# Patient Record
Sex: FEMALE | Race: WHITE
[De-identification: names, ages, dates, MRNs, and addresses within clinical notes are randomized per-mention and may not be internally consistent; named-entity substitution may affect disease eponyms.]

---

## 2019-04-08 ENCOUNTER — HOSPITAL ENCOUNTER (OUTPATIENT)
Dept: HOSPITAL 61 - NM | Age: 39
Discharge: HOME | End: 2019-04-08
Attending: FAMILY MEDICINE
Payer: COMMERCIAL

## 2019-04-08 DIAGNOSIS — R06.02: ICD-10-CM

## 2019-04-08 DIAGNOSIS — R53.83: ICD-10-CM

## 2019-04-08 DIAGNOSIS — R06.00: ICD-10-CM

## 2019-04-08 DIAGNOSIS — R03.0: Primary | ICD-10-CM

## 2019-04-08 PROCEDURE — 93017 CV STRESS TEST TRACING ONLY: CPT

## 2019-04-08 NOTE — RAD
MR#: [id number]

Account#: [id number]

Accession#: [id number]

Date of Study: 04/08/2019

Ordering Physician: DAGOBERTO PANDA, 

Referring Physician: LUTHER FERRER Tech: 





--------------- APPROVED REPORT --------------





Test Type:          Exercise

Stress Nurse/Tech: Simin Moore RN

Test Indications: Dyspnea,vomitting x1 month ago, elevated blood pressure

Cardiac History: Hypertension,prediabetic

Medications:     See Electronic Medical Record

Medical History: See Electronic Medical Record

Resting ECG:     SR

Resting Heart Rate: 75 bpm

Resting Blood Pressure: 139/82mmHg

Pretest Chest Pain: No chest pain



Nurse/Tech Notes

S1,S2 and lungs are clear to auscultation.

Consent: The procedure was explained to the patient in lay terms. Informed consent was witnessed. Odell
eout was entered into [employer]. History and Stress Test performed by Isela Ewing, RT (R) (N)



Stress Symptoms

Fatigue



POST EXERCISE

Reason for Termination: Reached target heart rate, Fatigue

Target HR: Yes

Max HR: 159 bpm

103% of Maximum Predicted HR: 154 bpm

Exercise duration: 7:26 min:sec, 3 Stage

Exercise capacity: 10METs

Max Blood Pressure: 159/98mmHg

Blood Pressure response to exercise: Normal blood pressure response during stress.

Heart Rate response to exercise: WNL

Chest Pain: No. 

Arrhythmia: No. 

ST Change: No. 



INTERPRETATION

Stress EKG Conclusion: No evidence of stress induced EKG changes



Conclusion

1. Average exercise capacity with 10 Mets achieved.

2. Normal resting and stress EKG

3. Normal BP/HR response.

4. Low risk stress test.

5. *Treadmill EKG only test, no imaging performed.



Signed by : Juan Suarez, 

Electronically Approved : 04/08/2019 14:58:41